# Patient Record
Sex: MALE | Race: WHITE
[De-identification: names, ages, dates, MRNs, and addresses within clinical notes are randomized per-mention and may not be internally consistent; named-entity substitution may affect disease eponyms.]

---

## 2017-12-06 ENCOUNTER — HOSPITAL ENCOUNTER (OUTPATIENT)
Dept: HOSPITAL 8 - CFH | Age: 82
End: 2017-12-06
Attending: INTERNAL MEDICINE
Payer: COMMERCIAL

## 2017-12-06 DIAGNOSIS — I35.1: Primary | ICD-10-CM

## 2017-12-06 PROCEDURE — 93306 TTE W/DOPPLER COMPLETE: CPT

## 2018-06-01 ENCOUNTER — HOSPITAL ENCOUNTER (INPATIENT)
Dept: HOSPITAL 8 - ED | Age: 83
LOS: 2 days | Discharge: HOME HEALTH | DRG: 299 | End: 2018-06-03
Attending: HOSPITALIST | Admitting: HOSPITALIST
Payer: COMMERCIAL

## 2018-06-01 VITALS — HEIGHT: 69 IN | WEIGHT: 190.04 LBS | BODY MASS INDEX: 28.15 KG/M2

## 2018-06-01 DIAGNOSIS — J98.11: ICD-10-CM

## 2018-06-01 DIAGNOSIS — I82.422: ICD-10-CM

## 2018-06-01 DIAGNOSIS — E44.0: ICD-10-CM

## 2018-06-01 DIAGNOSIS — R53.2: ICD-10-CM

## 2018-06-01 DIAGNOSIS — I82.411: ICD-10-CM

## 2018-06-01 DIAGNOSIS — J44.9: ICD-10-CM

## 2018-06-01 DIAGNOSIS — K59.00: ICD-10-CM

## 2018-06-01 DIAGNOSIS — Z66: ICD-10-CM

## 2018-06-01 DIAGNOSIS — Z99.81: ICD-10-CM

## 2018-06-01 DIAGNOSIS — I82.431: Primary | ICD-10-CM

## 2018-06-01 DIAGNOSIS — G89.29: ICD-10-CM

## 2018-06-01 DIAGNOSIS — M19.90: ICD-10-CM

## 2018-06-01 DIAGNOSIS — I50.32: ICD-10-CM

## 2018-06-01 DIAGNOSIS — Z87.891: ICD-10-CM

## 2018-06-01 LAB
ALBUMIN SERPL-MCNC: 2.8 G/DL (ref 3.4–5)
ANION GAP SERPL CALC-SCNC: 7 MMOL/L (ref 5–15)
APTT BLD: 24 SECONDS (ref 25–31)
BASOPHILS # BLD AUTO: 0.08 X10^3/UL (ref 0–0.1)
BASOPHILS NFR BLD AUTO: 1 % (ref 0–1)
CALCIUM SERPL-MCNC: 9.9 MG/DL (ref 8.5–10.1)
CHLORIDE SERPL-SCNC: 107 MMOL/L (ref 98–107)
CREAT SERPL-MCNC: 0.83 MG/DL (ref 0.7–1.3)
EOSINOPHIL # BLD AUTO: 0.62 X10^3/UL (ref 0–0.4)
EOSINOPHIL NFR BLD AUTO: 6 % (ref 1–7)
ERYTHROCYTE [DISTWIDTH] IN BLOOD BY AUTOMATED COUNT: 17.3 % (ref 9.4–14.8)
INR PPP: 1.02 (ref 0.93–1.1)
LYMPHOCYTES # BLD AUTO: 2.42 X10^3/UL (ref 1–3.4)
LYMPHOCYTES NFR BLD AUTO: 21 % (ref 22–44)
MCH RBC QN AUTO: 29.3 PG (ref 27.5–34.5)
MCHC RBC AUTO-ENTMCNC: 32.7 G/DL (ref 33.2–36.2)
MCV RBC AUTO: 89.6 FL (ref 81–97)
MD: NO
MONOCYTES # BLD AUTO: 1.27 X10^3/UL (ref 0.2–0.8)
MONOCYTES NFR BLD AUTO: 11 % (ref 2–9)
NEUTROPHILS # BLD AUTO: 6.99 X10^3/UL (ref 1.8–6.8)
NEUTROPHILS NFR BLD AUTO: 61 % (ref 42–75)
PLATELET # BLD AUTO: 368 X10^3/UL (ref 130–400)
PMV BLD AUTO: 7.7 FL (ref 7.4–10.4)
PROTHROMBIN TIME: 10.5 SECONDS (ref 9.6–11.5)
RBC # BLD AUTO: 4.37 X10^6/UL (ref 4.38–5.82)

## 2018-06-01 PROCEDURE — 85520 HEPARIN ASSAY: CPT

## 2018-06-01 PROCEDURE — 82040 ASSAY OF SERUM ALBUMIN: CPT

## 2018-06-01 PROCEDURE — 71045 X-RAY EXAM CHEST 1 VIEW: CPT

## 2018-06-01 PROCEDURE — 80048 BASIC METABOLIC PNL TOTAL CA: CPT

## 2018-06-01 PROCEDURE — 85610 PROTHROMBIN TIME: CPT

## 2018-06-01 PROCEDURE — 85730 THROMBOPLASTIN TIME PARTIAL: CPT

## 2018-06-01 PROCEDURE — 71275 CT ANGIOGRAPHY CHEST: CPT

## 2018-06-01 PROCEDURE — 94640 AIRWAY INHALATION TREATMENT: CPT

## 2018-06-01 PROCEDURE — 85025 COMPLETE CBC W/AUTO DIFF WBC: CPT

## 2018-06-01 PROCEDURE — 36415 COLL VENOUS BLD VENIPUNCTURE: CPT

## 2018-06-01 PROCEDURE — 96374 THER/PROPH/DIAG INJ IV PUSH: CPT

## 2018-06-01 PROCEDURE — 93005 ELECTROCARDIOGRAM TRACING: CPT

## 2018-06-01 RX ADMIN — DOCUSATE SODIUM SCH MG: 100 CAPSULE, LIQUID FILLED ORAL at 21:33

## 2018-06-01 RX ADMIN — ACETAMINOPHEN SCH MG: 500 TABLET, COATED ORAL at 21:33

## 2018-06-01 RX ADMIN — LOVASTATIN SCH MG: 20 TABLET ORAL at 21:33

## 2018-06-01 RX ADMIN — TAMSULOSIN HYDROCHLORIDE SCH MG: 0.4 CAPSULE ORAL at 21:33

## 2018-06-02 VITALS — DIASTOLIC BLOOD PRESSURE: 63 MMHG | SYSTOLIC BLOOD PRESSURE: 104 MMHG

## 2018-06-02 VITALS — DIASTOLIC BLOOD PRESSURE: 66 MMHG | SYSTOLIC BLOOD PRESSURE: 109 MMHG

## 2018-06-02 VITALS — SYSTOLIC BLOOD PRESSURE: 126 MMHG | DIASTOLIC BLOOD PRESSURE: 62 MMHG

## 2018-06-02 VITALS — SYSTOLIC BLOOD PRESSURE: 103 MMHG | DIASTOLIC BLOOD PRESSURE: 62 MMHG

## 2018-06-02 VITALS — SYSTOLIC BLOOD PRESSURE: 116 MMHG | DIASTOLIC BLOOD PRESSURE: 67 MMHG

## 2018-06-02 VITALS — SYSTOLIC BLOOD PRESSURE: 93 MMHG | DIASTOLIC BLOOD PRESSURE: 54 MMHG

## 2018-06-02 LAB
ANION GAP SERPL CALC-SCNC: 5 MMOL/L (ref 5–15)
CALCIUM SERPL-MCNC: 9.5 MG/DL (ref 8.5–10.1)
CHLORIDE SERPL-SCNC: 109 MMOL/L (ref 98–107)
CREAT SERPL-MCNC: 0.74 MG/DL (ref 0.7–1.3)

## 2018-06-02 RX ADMIN — ASPIRIN SCH MG: 81 TABLET, COATED ORAL at 09:18

## 2018-06-02 RX ADMIN — ACETAMINOPHEN SCH MG: 500 TABLET, COATED ORAL at 05:53

## 2018-06-02 RX ADMIN — FUROSEMIDE SCH MG: 40 TABLET ORAL at 09:18

## 2018-06-02 RX ADMIN — POTASSIUM CHLORIDE SCH MEQ: 20 TABLET, EXTENDED RELEASE ORAL at 09:18

## 2018-06-02 RX ADMIN — TAMSULOSIN HYDROCHLORIDE SCH MG: 0.4 CAPSULE ORAL at 20:12

## 2018-06-02 RX ADMIN — TAMSULOSIN HYDROCHLORIDE SCH MG: 0.4 CAPSULE ORAL at 09:17

## 2018-06-02 RX ADMIN — LOVASTATIN SCH MG: 20 TABLET ORAL at 20:12

## 2018-06-02 RX ADMIN — IPRATROPIUM BROMIDE SCH MG: 0.5 SOLUTION RESPIRATORY (INHALATION) at 06:59

## 2018-06-02 RX ADMIN — ACETAMINOPHEN SCH MG: 500 TABLET, COATED ORAL at 20:12

## 2018-06-02 RX ADMIN — FLUTICASONE FUROATE AND VILANTEROL TRIFENATATE SCH PUFF: 200; 25 POWDER RESPIRATORY (INHALATION) at 11:50

## 2018-06-02 RX ADMIN — DOCUSATE SODIUM SCH MG: 100 CAPSULE, LIQUID FILLED ORAL at 20:12

## 2018-06-02 RX ADMIN — RIVAROXABAN SCH MG: 15 TABLET, FILM COATED ORAL at 17:05

## 2018-06-02 RX ADMIN — VITAMIN D, TAB 1000IU (100/BT) SCH UNITS: 25 TAB at 09:24

## 2018-06-02 RX ADMIN — Medication SCH TAB: at 09:17

## 2018-06-02 RX ADMIN — DOCUSATE SODIUM SCH MG: 100 CAPSULE, LIQUID FILLED ORAL at 09:17

## 2018-06-02 RX ADMIN — ACETAMINOPHEN SCH MG: 500 TABLET, COATED ORAL at 13:40

## 2018-06-03 VITALS — SYSTOLIC BLOOD PRESSURE: 132 MMHG | DIASTOLIC BLOOD PRESSURE: 68 MMHG

## 2018-06-03 VITALS — SYSTOLIC BLOOD PRESSURE: 111 MMHG | DIASTOLIC BLOOD PRESSURE: 65 MMHG

## 2018-06-03 VITALS — DIASTOLIC BLOOD PRESSURE: 66 MMHG | SYSTOLIC BLOOD PRESSURE: 121 MMHG

## 2018-06-03 LAB
BASOPHILS # BLD AUTO: 0.05 X10^3/UL (ref 0–0.1)
BASOPHILS NFR BLD AUTO: 1 % (ref 0–1)
EOSINOPHIL # BLD AUTO: 1.02 X10^3/UL (ref 0–0.4)
EOSINOPHIL NFR BLD AUTO: 14 % (ref 1–7)
ERYTHROCYTE [DISTWIDTH] IN BLOOD BY AUTOMATED COUNT: 17.1 % (ref 9.4–14.8)
LYMPHOCYTES # BLD AUTO: 1.65 X10^3/UL (ref 1–3.4)
LYMPHOCYTES NFR BLD AUTO: 23 % (ref 22–44)
MCH RBC QN AUTO: 29.4 PG (ref 27.5–34.5)
MCHC RBC AUTO-ENTMCNC: 32.7 G/DL (ref 33.2–36.2)
MCV RBC AUTO: 90 FL (ref 81–97)
MD: NO
MONOCYTES # BLD AUTO: 0.73 X10^3/UL (ref 0.2–0.8)
MONOCYTES NFR BLD AUTO: 10 % (ref 2–9)
NEUTROPHILS # BLD AUTO: 3.68 X10^3/UL (ref 1.8–6.8)
NEUTROPHILS NFR BLD AUTO: 52 % (ref 42–75)
PLATELET # BLD AUTO: 317 X10^3/UL (ref 130–400)
PMV BLD AUTO: 7.7 FL (ref 7.4–10.4)
RBC # BLD AUTO: 3.67 X10^6/UL (ref 4.38–5.82)

## 2018-06-03 RX ADMIN — POTASSIUM CHLORIDE SCH MEQ: 20 TABLET, EXTENDED RELEASE ORAL at 11:45

## 2018-06-03 RX ADMIN — FLUTICASONE FUROATE AND VILANTEROL TRIFENATATE SCH PUFF: 200; 25 POWDER RESPIRATORY (INHALATION) at 11:41

## 2018-06-03 RX ADMIN — Medication SCH TAB: at 11:40

## 2018-06-03 RX ADMIN — ASPIRIN SCH MG: 81 TABLET, COATED ORAL at 11:39

## 2018-06-03 RX ADMIN — TAMSULOSIN HYDROCHLORIDE SCH MG: 0.4 CAPSULE ORAL at 11:42

## 2018-06-03 RX ADMIN — IPRATROPIUM BROMIDE SCH MG: 0.5 SOLUTION RESPIRATORY (INHALATION) at 07:03

## 2018-06-03 RX ADMIN — FUROSEMIDE SCH MG: 40 TABLET ORAL at 11:42

## 2018-06-03 RX ADMIN — VITAMIN D, TAB 1000IU (100/BT) SCH UNITS: 25 TAB at 11:40

## 2018-06-03 RX ADMIN — RIVAROXABAN SCH MG: 15 TABLET, FILM COATED ORAL at 11:41

## 2018-06-03 RX ADMIN — ACETAMINOPHEN SCH MG: 500 TABLET, COATED ORAL at 13:52

## 2018-06-03 RX ADMIN — DOCUSATE SODIUM SCH MG: 100 CAPSULE, LIQUID FILLED ORAL at 11:40

## 2018-06-03 RX ADMIN — ACETAMINOPHEN SCH MG: 500 TABLET, COATED ORAL at 05:43

## 2018-08-22 ENCOUNTER — OFFICE VISIT (OUTPATIENT)
Dept: URGENT CARE | Facility: PHYSICIAN GROUP | Age: 83
End: 2018-08-22
Payer: MEDICARE

## 2018-08-22 VITALS
BODY MASS INDEX: 29.16 KG/M2 | DIASTOLIC BLOOD PRESSURE: 80 MMHG | SYSTOLIC BLOOD PRESSURE: 120 MMHG | HEIGHT: 65 IN | OXYGEN SATURATION: 92 % | WEIGHT: 175 LBS | RESPIRATION RATE: 14 BRPM | TEMPERATURE: 97.3 F | HEART RATE: 72 BPM

## 2018-08-22 DIAGNOSIS — S00.412A ABRASION OF LEFT EAR CANAL, INITIAL ENCOUNTER: ICD-10-CM

## 2018-08-22 PROCEDURE — 99203 OFFICE O/P NEW LOW 30 MIN: CPT | Performed by: NURSE PRACTITIONER

## 2021-01-13 DIAGNOSIS — Z23 NEED FOR VACCINATION: ICD-10-CM

## 2021-04-14 NOTE — PROGRESS NOTES
"Chief Complaint   Patient presents with   • Ear Drainage     blood in  left  ear noticed today        HISTORY OF PRESENT ILLNESS: Patient is a 89 y.o. male who presents to urgent care today with his wife, both provide the history. He is concerned because he noted blood draining from his left ear today. He denies pain, fever, chills, headache or malaise. 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 He believes he may have scratched his inner ear while sleeping last night. He does use hearing aids and did have them re-measured recently. He otherwise denies any recent injury or trauma to ear.     Patient Active Problem List    Diagnosis Date Noted   • Venous insufficiency 05/16/2012   • Chronic obstruct airways disease (HCC) 05/16/2012   • Carcinoma of prostate (HCC) 03/27/2012   • AR (aortic regurgitation) 03/26/2012   • Premature atrial complex 03/26/2012   • CHEST PAIN 03/26/2012   • Edema 03/26/2012   • Fatigue 03/26/2012   • Hypercalcemia 03/26/2012   • Hyperglycemia 03/26/2012   • Hyperlipidemia 03/26/2012   • Overweight 03/26/2012   • Pericardial effusion 03/26/2012   • Pulmonary HTN (HCC) 03/26/2012   • SOB (shortness of breath) 03/26/2012   • Essential hypertension, benign 03/20/2012       Allergies:Paroxetine    Current Outpatient Prescriptions Ordered in [facility]   Medication Sig Dispense Refill   • lovastatin (MEVACOR) 20 MG TABS Take 1 Tab by mouth every evening. 90 Tab 0   • furosemide (LASIX) 40 MG TABS Take 1 Tab by mouth 2 Times a Day. 270 Tab 3   • potassium chloride SA (K-DUR) 20 MEQ TBCR Take 1 Tab by mouth 2 times a day. 180 Tab 3   • Multiple Vitamins-Minerals (OCUVITE) TABS Take 1 Tab by mouth every 12 hours.     • vitamin D (CHOLECALCIFEROL) 1000 UNIT TABS Take 5,000 Units by mouth every day.     • NON SPECIFIED Indications: \"pill for esophageal discomfort\", name unknown     • ipratropium (ATROVENT) 0.03 % Solution Spray 2 Sprays in nose every 12 hours.   " Note written, pt aware    • tamsulosin (FLOMAX) 0.4 MG capsule Take 0.4 mg by mouth ONE-HALF HOUR AFTER BREAKFAST.     • SYMBICORT 160-4.5 MCG/ACT Aerosol INHALE 2 PUFFS TWICE DAILY  WITH  SPACER.  RINSE  MOUTH  AFTER EACH USE. 3 Inhaler 3   • meloxicam (MOBIC) 7.5 MG TABS Take 7.5 mg by mouth every day.     • ASPIRIN 81 MG PO TABS Daily      • FLOMAX PO 0.4 mg 2 Times a Day. Daily       No current [facility]-ordered facility-administered medications on file.        Past Medical History:   Diagnosis Date   • AR (aortic regurgitation) 3/26/2012   • Arthritis    • CHEST PAIN 3/26/2012   • COPD (chronic obstructive pulmonary disease) (HCC)    • Degeneration of lumbar intervertebral disc    • Edema 3/26/2012   • Fatigue 3/26/2012   • Hypercalcemia 3/26/2012   • Hyperglycemia 3/26/2012   • Hyperlipidemia 3/26/2012   • Hypertension    • Osteoarthrosis    • Overweight(278.02) 3/26/2012   • Pericardial effusion 3/26/2012   • Premature atrial complex 3/26/2012   • Prostate cancer (HCC)    • Pulmonary HTN (HCC) 3/26/2012   • SOB (shortness of breath) 3/26/2012       Social History   Substance Use Topics   • Smoking status: Former Smoker     Packs/day: 1.50     Years: 35.00     Types: Cigarettes     Quit date: 1978   • Smokeless tobacco: Never Used      Comment: smoked 25 yrs; quit 30 yrs ago   • Alcohol use 0.0 oz/week      Comment: occasional hard liquor       Family Status   Relation Status   • Fa    • Mo    • Bro    • Bro Alive   • Sis Alive   • Child Alive        x3   • Bro (Not Specified)   • Sis (Not Specified)     Family History   Problem Relation Age of Onset   • Cancer Mother         liver   • Cancer Brother         blood   • Cancer Sister         bone       ROS:  Review of Systems   Constitutional: Negative for fever, chills, weight loss, malaise, and fatigue.   HENT: Positive for bloody drainage from left ear. Negative for ear pain, nosebleeds, congestion, sore throat and neck pain.    Eyes: Negative for vision  "changes.   Neuro: Negative for headache, sensory changes, weakness, seizure, LOC.   Cardiovascular: Negative for chest pain, palpitations, orthopnea and leg swelling.   Respiratory: Negative for cough, sputum production, shortness of breath and wheezing.   Gastrointestinal: Negative for abdominal pain, nausea, vomiting or diarrhea.   Musculoskeletal: Negative for falls, neck pain, back pain, joint pain, myalgias.   Skin: Negative for rash, diaphoresis.     Exam:  Blood pressure 120/80, pulse 72, temperature 36.3 °C (97.3 °F), resp. rate 14, height 1.651 m (5' 5\"), weight 79.4 kg (175 lb), SpO2 92 %.  General: well-nourished, well-developed male in NAD  Head: normocephalic, atraumatic  Eyes: PERRLA, no conjunctival injection, acuity grossly intact, lids normal.  Ears: normal shape and symmetry. External canals are without any significant edema or erythema. Tympanic membranes are without any inflammation, no effusion. There is scant amount of bright red blood in left canal, unable to see origin of bleeding due to blood, TM appear intact. Gross auditory acuity is intact.  Nose: symmetrical without tenderness, no discharge.  Mouth/Throat: reasonable hygiene, no erythema, exudates or tonsillar enlargement.  Neck: no masses, range of motion within normal limits, no tracheal deviation. No obvious thyroid enlargement.   Lymph: no cervical adenopathy. No supraclavicular adenopathy.   Neuro: alert and oriented. Cranial nerves 1-12 grossly intact. No sensory deficit.   Cardiovascular: regular rate and rhythm. No edema.  Pulmonary: no distress. Chest is symmetrical with respiration, no wheezes, crackles, or rhonchi.   Musculoskeletal: no clubbing, appropriate muscle tone, gait is stable.  Skin: warm, dry, intact, no clubbing, no cyanosis, no rashes.   Psych: appropriate mood, affect, judgement.         Assessment/Plan:  1. Abrasion of left ear canal, initial encounter           Suspect ear canal abrasion. Keep ear clean and " dry. Refrain from using hearing aids until completely healed. S/S secondary infection discussed, return to clinic with concerns.   Supportive care, differential diagnoses, and indications for immediate follow-up discussed with patient.   Pathogenesis of diagnosis discussed including typical length and natural progression.   Instructed to return to clinic or nearest emergency department for any change in condition, further concerns, or worsening of symptoms.  Patient states understanding of the plan of care and discharge instructions.  Instructed to make an appointment, for follow up, with his primary care provider.        Please note that this dictation was created using voice recognition software. I have made every reasonable attempt to correct obvious errors, but I expect that there are errors of grammar and possibly content that I did not discover before finalizing the note.      NAAG Tabares.

## 2022-05-27 ENCOUNTER — HOSPITAL ENCOUNTER (OUTPATIENT)
Facility: MEDICAL CENTER | Age: 87
End: 2022-05-27
Attending: PHYSICIAN ASSISTANT
Payer: MEDICARE

## 2022-05-27 LAB
APPEARANCE UR: CLEAR
BILIRUB UR QL STRIP.AUTO: NEGATIVE
COLOR UR: YELLOW
GLUCOSE UR STRIP.AUTO-MCNC: NEGATIVE MG/DL
KETONES UR STRIP.AUTO-MCNC: NEGATIVE MG/DL
LEUKOCYTE ESTERASE UR QL STRIP.AUTO: NEGATIVE
MICRO URNS: NORMAL
NITRITE UR QL STRIP.AUTO: NEGATIVE
PH UR STRIP.AUTO: 6 [PH] (ref 5–8)
PROT UR QL STRIP: NEGATIVE MG/DL
RBC UR QL AUTO: NEGATIVE
SP GR UR STRIP.AUTO: 1.01
UROBILINOGEN UR STRIP.AUTO-MCNC: 0.2 MG/DL

## 2022-05-27 PROCEDURE — 81003 URINALYSIS AUTO W/O SCOPE: CPT

## 2023-01-26 ENCOUNTER — HOSPITAL ENCOUNTER (OUTPATIENT)
Facility: MEDICAL CENTER | Age: 88
End: 2023-01-26
Attending: INTERNAL MEDICINE
Payer: MEDICARE

## 2023-01-26 LAB
FORWARD REASON: SPWHY: NORMAL
FORWARDED TO LAB: SPWHR: NORMAL
SPECIMEN SENT (2ND): SPWT2: NORMAL
SPECIMEN SENT: SPWT1: NORMAL

## 2025-01-31 ENCOUNTER — HOSPITAL ENCOUNTER (OUTPATIENT)
Facility: MEDICAL CENTER | Age: OVER 89
End: 2025-01-31
Attending: INTERNAL MEDICINE
Payer: MEDICARE

## 2025-01-31 LAB
FORWARD REASON: SPWHY: NORMAL
FORWARDED TO LAB: SPWHR: NORMAL
SPECIMEN SENT: SPWT1: NORMAL

## 2025-02-21 ENCOUNTER — HOSPITAL ENCOUNTER (OUTPATIENT)
Facility: MEDICAL CENTER | Age: OVER 89
End: 2025-02-21
Attending: INTERNAL MEDICINE
Payer: MEDICARE

## 2025-02-21 LAB
FORWARD REASON: SPWHY: NORMAL
FORWARD REASON: SPWHY: NORMAL
FORWARDED TO LAB: SPWHR: NORMAL
FORWARDED TO LAB: SPWHR: NORMAL
SPECIMEN SENT: SPWT1: NORMAL
SPECIMEN SENT: SPWT1: NORMAL

## 2025-04-17 ENCOUNTER — HOSPITAL ENCOUNTER (OUTPATIENT)
Facility: MEDICAL CENTER | Age: OVER 89
End: 2025-04-17
Attending: INTERNAL MEDICINE
Payer: MEDICARE

## 2025-04-17 LAB
FORWARD REASON: SPWHY: NORMAL
FORWARD REASON: SPWHY: NORMAL
FORWARDED TO LAB: SPWHR: NORMAL
FORWARDED TO LAB: SPWHR: NORMAL
SPECIMEN SENT (2ND): SPWT2: NORMAL
SPECIMEN SENT (3RD): SPWT3: NORMAL
SPECIMEN SENT (4TH): SPWT4: NORMAL
SPECIMEN SENT: SPWT1: NORMAL
SPECIMEN SENT: SPWT1: NORMAL